# Patient Record
Sex: FEMALE | Race: WHITE | NOT HISPANIC OR LATINO | Employment: UNEMPLOYED | URBAN - METROPOLITAN AREA
[De-identification: names, ages, dates, MRNs, and addresses within clinical notes are randomized per-mention and may not be internally consistent; named-entity substitution may affect disease eponyms.]

---

## 2018-12-12 ENCOUNTER — TELEPHONE (OUTPATIENT)
Dept: PLASTIC SURGERY | Facility: CLINIC | Age: 23
End: 2018-12-12

## 2018-12-12 NOTE — TELEPHONE ENCOUNTER
left voice mail for patient reminding them of appointment scheduled on 12/18/18 at 5:15 p.m.  left call back number.  Elodia Wu LPN

## 2018-12-18 ENCOUNTER — OFFICE VISIT (OUTPATIENT)
Dept: PLASTIC SURGERY | Facility: CLINIC | Age: 23
End: 2018-12-18
Payer: COMMERCIAL

## 2018-12-18 VITALS
TEMPERATURE: 98.1 F | OXYGEN SATURATION: 98 % | WEIGHT: 282.5 LBS | DIASTOLIC BLOOD PRESSURE: 76 MMHG | HEART RATE: 66 BPM | SYSTOLIC BLOOD PRESSURE: 126 MMHG | BODY MASS INDEX: 40.44 KG/M2 | HEIGHT: 70 IN

## 2018-12-18 DIAGNOSIS — Z12.31 VISIT FOR SCREENING MAMMOGRAM: ICD-10-CM

## 2018-12-18 DIAGNOSIS — F64.0 GENDER DYSPHORIA IN ADULT: Primary | ICD-10-CM

## 2018-12-18 RX ORDER — IBUPROFEN 200 MG
200-400 TABLET ORAL
COMMUNITY

## 2018-12-18 ASSESSMENT — MIFFLIN-ST. JEOR: SCORE: 2111.66

## 2018-12-18 NOTE — NURSING NOTE
"Chief Complaint   Patient presents with     Consult     NEW - Top Surgery       Vitals:    12/18/18 1743   BP: 126/76   Pulse: 66   Temp: 98.1  F (36.7  C)   TempSrc: Oral   SpO2: 98%   Weight: 128.1 kg (282 lb 8 oz)   Height: 1.778 m (5' 10\")       Body mass index is 40.53 kg/m .      Lisandro Richards, EMT on 12/18/2018 at 5:47 PM                           "

## 2018-12-18 NOTE — LETTER
12/18/2018       RE: Gabriela Guan  [address]  [place] [place] [address]     Dear Colleague,    Thank you for referring your patient, Gabriela Guan, to the [facility] PLASTIC AND RECONSTRUCTIVE SURGERY at [facility]. Please see a copy of my visit note below.    REFERRING PROVIDER: Chanel Spencer    REASON FOR CONSULTATION: Gender dysphoria, requesting top surgery.    HPI: Patient is a 23-year-old gender non-binary transmasculine individual who prefers he him pronouns or they them pronouns, referred to me by Chanel Spencer for possible top surgery.  The patient has been considering undergoing top surgery for the past 6 years.  By undergoing surgery, they would like to better align their physical body with her chosen gender identity.  It does not bind her chest.  They transitioned 6 years ago.  New name is Shekhar.  Has consider hormone therapy, but has decided against it given that family history includes blood clotting.  Denies any previous breast history.    MEDS:   Prior to Admission medications    Medication Sig Start Date End Date Taking? Authorizing Provider   FLUoxetine (PROZAC) 20 MG capsule TAKE 3 CAPSULES BY MOUTH EVERY DAY 12/3/18   Reported, Patient   ibuprofen (ADVIL/MOTRIN) 200 MG tablet Take 200-400 mg by mouth    Reported, Patient       ALLERGIES:   Allergies   Allergen Reactions     Citalopram Other (See Comments) and Shortness Of Breath     Throat tightness, gradual onset over two days.  Fluoxetine is OK.         PMH: Depression and anxiety.    PSH: None.    SH:   Social History     Tobacco Use     Smoking status: Never Smoker     Smokeless tobacco: Never Used   Substance Use Topics     Alcohol use: Not on file   Works at [employer].    FH: Blood clotting, breast cancer, and dementia.    ROS: Denies chest pain, shortness of breath, MI, CVA, diabetes, DVT, PE, and bleeding disorders.    PHYSICAL EXAMINATION: /76   Pulse 66   Temp 98.1  F  "(36.7  C) (Oral)   Ht 1.778 m (5' 10\")   Wt 128.1 kg (282 lb 8 oz)   SpO2 98%   BMI 40.53 kg/m     General: No acute distress.  Obese body habitus.  On examination of the chest, patient has bilateral grade 3 ptosis.  There are large lateral thoracic rolls bilaterally.  Pectoralis muscles intact bilaterally.  The patient has bilateral axillary breast tissue with the right folding over more than the left.  Breasts are pendulous.  Notch to nipple distance is 32 cm bilaterally.  Air areolar diameter is 7 cm on the 6.5 cm on the left.  Nipple to fold distance is 18 cm on the right and 17 cm on the base diameter 16 cm bilaterally.  There is no palpable breast mass.  There is no nipple discharge.  There is no axillary adenopathy.    ASSESSMENT: Gender dysphoria, requesting top surgery.    PLAN: I explained the need for a letter of support from a mental health professional.  I am also requesting a baseline screening mammogram.  I am recommending weight loss prior to surgery to reduce the risk of wound healing difficulties, asymmetry, contour deformity, and need for revision surgery.  Patient will consider this.  Patient is a potential candidate for bilateral simple complete mastectomy with free nipple graft reconstruction as a form of top surgery.  I explained the procedure in detail today.  I explained the risks to include bleeding, infection, injury to surrounding structures, fluid collection, nipple or nipple graft loss, nipple sensory loss, change in nipple size, wound healing difficulties, contour deformity, dog ears, asymmetry, and need for revision surgery.  Patient accepts these risks and wishes to proceed with surgery.  We will wait for a letter of support and weight loss.    Total time spent with patient was 30 min of which greater than 50% was in counseling.      Markel Zamorano MD      "

## 2018-12-19 ENCOUNTER — PATIENT OUTREACH (OUTPATIENT)
Dept: PLASTIC SURGERY | Facility: CLINIC | Age: 23
End: 2018-12-19

## 2018-12-19 NOTE — PROGRESS NOTES
"REFERRING PROVIDER: Chanel Spencer    REASON FOR CONSULTATION: Gender dysphoria, requesting top surgery.    HPI: Patient is a 23-year-old gender non-binary transmasculine individual who prefers he him pronouns or they them pronouns, referred to me by Chanel Spencer for possible top surgery.  The patient has been considering undergoing top surgery for the past 6 years.  By undergoing surgery, they would like to better align their physical body with her chosen gender identity.  It does not bind her chest.  They transitioned 6 years ago.  New name is Shekhar.  Has consider hormone therapy, but has decided against it given that family history includes blood clotting.  Denies any previous breast history.    MEDS:   Prior to Admission medications    Medication Sig Start Date End Date Taking? Authorizing Provider   FLUoxetine (PROZAC) 20 MG capsule TAKE 3 CAPSULES BY MOUTH EVERY DAY 12/3/18   Reported, Patient   ibuprofen (ADVIL/MOTRIN) 200 MG tablet Take 200-400 mg by mouth    Reported, Patient       ALLERGIES:   Allergies   Allergen Reactions     Citalopram Other (See Comments) and Shortness Of Breath     Throat tightness, gradual onset over two days.  Fluoxetine is OK.         PMH: Depression and anxiety.    PSH: None.    SH:   Social History     Tobacco Use     Smoking status: Never Smoker     Smokeless tobacco: Never Used   Substance Use Topics     Alcohol use: Not on file   Works at [employer].    FH: Blood clotting, breast cancer, and dementia.    ROS: Denies chest pain, shortness of breath, MI, CVA, diabetes, DVT, PE, and bleeding disorders.    PHYSICAL EXAMINATION: /76   Pulse 66   Temp 98.1  F (36.7  C) (Oral)   Ht 1.778 m (5' 10\")   Wt 128.1 kg (282 lb 8 oz)   SpO2 98%   BMI 40.53 kg/m    General: No acute distress.  Obese body habitus.  On examination of the chest, patient has bilateral grade 3 ptosis.  There are large lateral thoracic rolls bilaterally.  Pectoralis muscles intact bilaterally.  " The patient has bilateral axillary breast tissue with the right folding over more than the left.  Breasts are pendulous.  Notch to nipple distance is 32 cm bilaterally.  Air areolar diameter is 7 cm on the 6.5 cm on the left.  Nipple to fold distance is 18 cm on the right and 17 cm on the base diameter 16 cm bilaterally.  There is no palpable breast mass.  There is no nipple discharge.  There is no axillary adenopathy.    ASSESSMENT: Gender dysphoria, requesting top surgery.    PLAN: I explained the need for a letter of support from a mental health professional.  I am also requesting a baseline screening mammogram.  I am recommending weight loss prior to surgery to reduce the risk of wound healing difficulties, asymmetry, contour deformity, and need for revision surgery.  Patient will consider this.  Patient is a potential candidate for bilateral simple complete mastectomy with free nipple graft reconstruction as a form of top surgery.  I explained the procedure in detail today.  I explained the risks to include bleeding, infection, injury to surrounding structures, fluid collection, nipple or nipple graft loss, nipple sensory loss, change in nipple size, wound healing difficulties, contour deformity, dog ears, asymmetry, and need for revision surgery.  Patient accepts these risks and wishes to proceed with surgery.  We will wait for a letter of support and weight loss.    Total time spent with patient was 30 min of which greater than 50% was in counseling.

## 2018-12-20 NOTE — PROGRESS NOTES
[facility]:  Care Coordination Note     SITUATION   Patient is a 23 year old who is receiving support for:  Clinic Care Coordination - Initial  .    BACKGROUND     Pt requesting top surgery with Dr. Zamorano, initial consultation 12/18/18.    ASSESSMENT     Surgery              [facility] Assessment  [facility] Care ([facility]) Enrollment: Enrolled  Patient has a therapist: Yes  Name of therapist: Sanjay Ly  Therapist's phone number: [phone]  Letter of support #1: Requested  Surgery being considered: Yes  Mastectomy: Yes          PLAN       Nursing Interventions:   [facility] program and services discussed with patient. Educational packet provided and reviewed with patient. Process for accessing surgery discussed including: Letters of support, if surgeon requests mammogram, PA insurance process, surgery scheduling, and approximate timeline. MELISSA and photography consent signed by patient.    Follow-up plan:  Patient needs to obtain and provide letter of support for surgery. Per Dr. Zamorano's note pt needs mammogram, complete procedure and inform LPN/trans care coordinator when complete. Then PA will be submitted.         Ulises Mcdonald

## 2019-01-14 ENCOUNTER — TELEPHONE (OUTPATIENT)
Dept: PLASTIC SURGERY | Facility: CLINIC | Age: 24
End: 2019-01-14

## 2019-01-14 NOTE — TELEPHONE ENCOUNTER
Writer spoke with patient. My C message to patient not read so writer reached out to ensure patient has all relevant information pertaining to mammogram screening. Patient expressed understanding and took down this nurses direct phone number in case he has questions or concerns.     Elodia Wu LPN  [facility]

## 2019-02-15 ENCOUNTER — HEALTH MAINTENANCE LETTER (OUTPATIENT)
Age: 24
End: 2019-02-15

## 2020-03-11 ENCOUNTER — HEALTH MAINTENANCE LETTER (OUTPATIENT)
Age: 25
End: 2020-03-11

## 2021-01-03 ENCOUNTER — HEALTH MAINTENANCE LETTER (OUTPATIENT)
Age: 26
End: 2021-01-03

## 2021-04-25 ENCOUNTER — HEALTH MAINTENANCE LETTER (OUTPATIENT)
Age: 26
End: 2021-04-25

## 2021-10-10 ENCOUNTER — HEALTH MAINTENANCE LETTER (OUTPATIENT)
Age: 26
End: 2021-10-10

## 2021-12-17 ENCOUNTER — HOSPITAL ENCOUNTER (EMERGENCY)
Facility: HOSPITAL | Age: 26
Discharge: HOME | End: 2021-12-17
Attending: EMERGENCY MEDICINE | Admitting: EMERGENCY MEDICINE
Payer: COMMERCIAL

## 2021-12-17 ENCOUNTER — APPOINTMENT (OUTPATIENT)
Dept: CT IMAGING | Facility: HOSPITAL | Age: 26
End: 2021-12-17
Attending: EMERGENCY MEDICINE
Payer: COMMERCIAL

## 2021-12-17 VITALS
OXYGEN SATURATION: 97 % | SYSTOLIC BLOOD PRESSURE: 131 MMHG | RESPIRATION RATE: 12 BRPM | HEART RATE: 82 BPM | DIASTOLIC BLOOD PRESSURE: 85 MMHG | BODY MASS INDEX: 40.37 KG/M2 | HEIGHT: 70 IN | TEMPERATURE: 98.4 F | WEIGHT: 282 LBS

## 2021-12-17 DIAGNOSIS — N30.91 HEMORRHAGIC CYSTITIS: ICD-10-CM

## 2021-12-17 LAB
ALBUMIN SERPL-MCNC: 3.9 G/DL (ref 3.5–5)
ALBUMIN UR-MCNC: 70 MG/DL
ALP SERPL-CCNC: 66 U/L (ref 45–120)
ALT SERPL W P-5'-P-CCNC: 32 U/L (ref 0–45)
ANION GAP SERPL CALCULATED.3IONS-SCNC: 10 MMOL/L (ref 5–18)
APPEARANCE UR: ABNORMAL
AST SERPL W P-5'-P-CCNC: 22 U/L (ref 0–40)
BACTERIA #/AREA URNS HPF: ABNORMAL /HPF
BASOPHILS # BLD AUTO: 0 10E3/UL (ref 0–0.2)
BASOPHILS NFR BLD AUTO: 0 %
BILIRUB SERPL-MCNC: 0.6 MG/DL (ref 0–1)
BILIRUB UR QL STRIP: NEGATIVE
BUN SERPL-MCNC: 13 MG/DL (ref 8–22)
CALCIUM SERPL-MCNC: 9.1 MG/DL (ref 8.5–10.5)
CHLORIDE BLD-SCNC: 105 MMOL/L (ref 98–107)
CK SERPL-CCNC: 60 U/L (ref 30–190)
CO2 SERPL-SCNC: 25 MMOL/L (ref 22–31)
COLOR UR AUTO: YELLOW
CREAT SERPL-MCNC: 1.01 MG/DL (ref 0.6–1.3)
EOSINOPHIL # BLD AUTO: 0.1 10E3/UL (ref 0–0.7)
EOSINOPHIL NFR BLD AUTO: 1 %
ERYTHROCYTE [DISTWIDTH] IN BLOOD BY AUTOMATED COUNT: 12.5 % (ref 10–15)
GFR SERPL CREATININE-BSD FRML MDRD: 76 ML/MIN/1.73M2
GLUCOSE BLD-MCNC: 94 MG/DL (ref 70–125)
GLUCOSE UR STRIP-MCNC: NEGATIVE MG/DL
HCT VFR BLD AUTO: 43.4 % (ref 35–53)
HGB BLD-MCNC: 14.5 G/DL (ref 11.7–17.7)
HGB UR QL STRIP: ABNORMAL
IMM GRANULOCYTES # BLD: 0 10E3/UL
IMM GRANULOCYTES NFR BLD: 0 %
KETONES UR STRIP-MCNC: NEGATIVE MG/DL
LEUKOCYTE ESTERASE UR QL STRIP: ABNORMAL
LYMPHOCYTES # BLD AUTO: 2.5 10E3/UL (ref 0.8–5.3)
LYMPHOCYTES NFR BLD AUTO: 26 %
MCH RBC QN AUTO: 29.2 PG (ref 26.5–33)
MCHC RBC AUTO-ENTMCNC: 33.4 G/DL (ref 31.5–36.5)
MCV RBC AUTO: 88 FL (ref 78–100)
MONOCYTES # BLD AUTO: 0.5 10E3/UL (ref 0–1.3)
MONOCYTES NFR BLD AUTO: 5 %
MUCOUS THREADS #/AREA URNS LPF: PRESENT /LPF
NEUTROPHILS # BLD AUTO: 6.5 10E3/UL (ref 1.6–8.3)
NEUTROPHILS NFR BLD AUTO: 68 %
NITRATE UR QL: NEGATIVE
NRBC # BLD AUTO: 0 10E3/UL
NRBC BLD AUTO-RTO: 0 /100
PH UR STRIP: 5.5 [PH] (ref 5–7)
PLATELET # BLD AUTO: 333 10E3/UL (ref 150–450)
POTASSIUM BLD-SCNC: 4.2 MMOL/L (ref 3.5–5)
PROT SERPL-MCNC: 7.6 G/DL (ref 6–8)
RBC # BLD AUTO: 4.96 10E6/UL (ref 3.8–5.9)
RBC URINE: 149 /HPF
SODIUM SERPL-SCNC: 140 MMOL/L (ref 136–145)
SP GR UR STRIP: 1.03 (ref 1–1.03)
SQUAMOUS EPITHELIAL: 47 /HPF
UROBILINOGEN UR STRIP-MCNC: <2 MG/DL
WBC # BLD AUTO: 9.6 10E3/UL (ref 4–11)
WBC CLUMPS #/AREA URNS HPF: PRESENT /HPF
WBC URINE: >182 /HPF

## 2021-12-17 PROCEDURE — 74176 CT ABD & PELVIS W/O CONTRAST: CPT

## 2021-12-17 PROCEDURE — 36415 COLL VENOUS BLD VENIPUNCTURE: CPT | Performed by: EMERGENCY MEDICINE

## 2021-12-17 PROCEDURE — 85025 COMPLETE CBC W/AUTO DIFF WBC: CPT | Performed by: EMERGENCY MEDICINE

## 2021-12-17 PROCEDURE — 80053 COMPREHEN METABOLIC PANEL: CPT | Performed by: EMERGENCY MEDICINE

## 2021-12-17 PROCEDURE — 99285 EMERGENCY DEPT VISIT HI MDM: CPT | Mod: 25

## 2021-12-17 PROCEDURE — 82550 ASSAY OF CK (CPK): CPT | Performed by: EMERGENCY MEDICINE

## 2021-12-17 PROCEDURE — 87086 URINE CULTURE/COLONY COUNT: CPT | Performed by: EMERGENCY MEDICINE

## 2021-12-17 PROCEDURE — 81001 URINALYSIS AUTO W/SCOPE: CPT | Performed by: EMERGENCY MEDICINE

## 2021-12-17 RX ORDER — CEPHALEXIN 500 MG/1
500 CAPSULE ORAL 2 TIMES DAILY
Qty: 10 CAPSULE | Refills: 0 | Status: SHIPPED | OUTPATIENT
Start: 2021-12-17 | End: 2021-12-22

## 2021-12-17 ASSESSMENT — MIFFLIN-ST. JEOR: SCORE: 2094.39

## 2021-12-17 NOTE — ED TRIAGE NOTES
Pt developed dysuria and dark urine today so pt went to urgent care for evaluation. Ua came back with large amt of blood and  Greater than 300 of protein in her urine so she was sent here for  Further workup.pt prefers he/him/they pronouns.

## 2021-12-17 NOTE — ED NOTES
She comes from urgent care where they found her urine to have protein in it. They sent her here for further evaluation. She denies any kidney issues. She does have some cramping in her abdomen.

## 2021-12-17 NOTE — ED PROVIDER NOTES
[facility] EMERGENCY DEPARTMENT  PHYSICIAN NOTE    MRN: [id number]    FINAL IMPRESSION     1. Hemorrhagic cystitis        ED COURSE & MDM       11:47 AM Initial history and physical performed. Plan of care discussed. PPE utilized includes N95 mask, face shield, and gloves.    12:57 PM Patient reevaluated.    2:34 PM Patient reevaluated prior to discharge.    Patient presented for hematuria and proteinuria. Initial vital signs reassuring.  They were sent here for hematuria and proteinuria, however on repeat UA there are more WBCs and RBCs, more consistent with hemorrhagic cystitis.  Renal stones considered, however none are visualized on CT.  The proteinuria is mild, and may be related to infection.  Renal function is normal, making a nephritic or nephrotic syndrome less likely.  I did discuss with the patient that they need to follow-up with the primary care provider to ensure the UA normalizes and that there is nothing more concerning as a cause of the UA results. All available labs reviewed and unremarkable unless otherwise described previously. Patient discharged in stable condition. Return precautions provided. All questions answered.    ===================================================================    HPI     Shekhar Guan is a 26 year old person with no relevant significant PMH presenting with dysuria. Patient states they developed dysuria this morning, so decided to go to urgent care. Patient reports the UA at urgent care showed a high level of blood and protein in the urine, so patient was sent here for further evaluation. Patient denies any back or flank pain. Denies any family history of kidney problems. Patient notes some menstrual-like abdominal cramps, even though they are not on their period. Patient states they had brief chest pain yesterday, but attributes that to anxiety from a job interview. States they have had chest pain with anxiety in the past. Patient denies  "any nausea, vomiting, or fever.    ROS  See HPI.    Problem list, medications, allergies, PMH, PSH, family history, and social history reviewed and updated as able in Epic.      PHYSICAL EXAM     Vitals:    12/17/21 1135   BP: 131/85   Pulse: 82   Resp: 12   Temp: 98.4  F (36.9  C)   TempSrc: Tympanic   SpO2: 97%   Weight: 127.9 kg (282 lb)   Height: 1.778 m (5' 10\")      Constitutional: Alert, no acute distress.  Eyes: Sclera anicteric, EOMI.  Pulm: Non-labored respirations.  Abdomen: Soft, non-tender.  Neuro: A/O x3. Normal speech. No focal deficits.  Skin: Warm and dry, no rash.  Psych: Cooperative, able to follow commands. Intact attention.      TESTING   All testing reviewed and interpreted.    EKG  None.      LABS  Labs Ordered and Resulted from Time of ED Arrival to Time of ED Departure   ROUTINE UA WITH MICROSCOPIC REFLEX TO CULTURE - Abnormal       Result Value    Color Urine Yellow      Appearance Urine Cloudy (*)     Glucose Urine Negative      Bilirubin Urine Negative      Ketones Urine Negative      Specific Gravity Urine 1.030      Blood Urine >1.0 mg/dL (*)     pH Urine 5.5      Protein Albumin Urine 70  (*)     Urobilinogen Urine <2.0      Nitrite Urine Negative      Leukocyte Esterase Urine 500 Jen/uL (*)     Bacteria Urine Many (*)     WBC Clumps Urine Present (*)     Mucus Urine Present (*)     RBC Urine 149 (*)     WBC Urine >182 (*)     Squamous Epithelials Urine 47 (*)    COMPREHENSIVE METABOLIC PANEL - Normal    Sodium 140      Potassium 4.2      Chloride 105      Carbon Dioxide (CO2) 25      Anion Gap 10      Urea Nitrogen 13      Creatinine 1.01      Calcium 9.1      Glucose 94      Alkaline Phosphatase 66      AST 22      ALT 32      Protein Total 7.6      Albumin 3.9      Bilirubin Total 0.6      GFR Estimate 76     CK TOTAL - Normal    CK 60     CBC WITH PLATELETS AND DIFFERENTIAL    WBC Count 9.6      RBC Count 4.96      Hemoglobin 14.5      Hematocrit 43.4      MCV 88      MCH 29.2      " MCHC 33.4      RDW 12.5      Platelet Count 333      % Neutrophils 68      % Lymphocytes 26      % Monocytes 5      % Eosinophils 1      % Basophils 0      % Immature Granulocytes 0      NRBCs per 100 WBC 0      Absolute Neutrophils 6.5      Absolute Lymphocytes 2.5      Absolute Monocytes 0.5      Absolute Eosinophils 0.1      Absolute Basophils 0.0      Absolute Immature Granulocytes 0.0      Absolute NRBCs 0.0     URINE CULTURE       IMAGING  CT Abdomen Pelvis w/o Contrast   Final Result   IMPRESSION:    1.  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    2.  Kidneys, ureters and bladder otherwise normal.           I attest that Leoncio Fairbanks is acting in a scribe capacity, has observed my performance of services, and has documented them in accordance with my direction.     Larry Jennings MD  12/17/21 [id number]

## 2021-12-18 LAB — BACTERIA UR CULT: NORMAL

## 2021-12-20 ENCOUNTER — PATIENT OUTREACH (OUTPATIENT)
Dept: NURSING | Facility: CLINIC | Age: 26
End: 2021-12-20
Payer: COMMERCIAL

## 2021-12-20 ENCOUNTER — TELEPHONE (OUTPATIENT)
Dept: CARE COORDINATION | Facility: CLINIC | Age: 26
End: 2021-12-20

## 2021-12-20 ENCOUNTER — PATIENT OUTREACH (OUTPATIENT)
Dept: CARE COORDINATION | Facility: CLINIC | Age: 26
End: 2021-12-20

## 2021-12-20 DIAGNOSIS — Z71.89 COUNSELING AND COORDINATION OF CARE: Primary | ICD-10-CM

## 2021-12-20 SDOH — HEALTH STABILITY: PHYSICAL HEALTH: ON AVERAGE, HOW MANY MINUTES DO YOU ENGAGE IN EXERCISE AT THIS LEVEL?: 0 MIN

## 2021-12-20 SDOH — ECONOMIC STABILITY: FOOD INSECURITY: WITHIN THE PAST 12 MONTHS, YOU WORRIED THAT YOUR FOOD WOULD RUN OUT BEFORE YOU GOT MONEY TO BUY MORE.: NEVER TRUE

## 2021-12-20 SDOH — ECONOMIC STABILITY: TRANSPORTATION INSECURITY
IN THE PAST 12 MONTHS, HAS THE LACK OF TRANSPORTATION KEPT YOU FROM MEDICAL APPOINTMENTS OR FROM GETTING MEDICATIONS?: NO

## 2021-12-20 SDOH — ECONOMIC STABILITY: TRANSPORTATION INSECURITY
IN THE PAST 12 MONTHS, HAS LACK OF TRANSPORTATION KEPT YOU FROM MEETINGS, WORK, OR FROM GETTING THINGS NEEDED FOR DAILY LIVING?: NO

## 2021-12-20 SDOH — HEALTH STABILITY: PHYSICAL HEALTH: ON AVERAGE, HOW MANY DAYS PER WEEK DO YOU ENGAGE IN MODERATE TO STRENUOUS EXERCISE (LIKE A BRISK WALK)?: 0 DAYS

## 2021-12-20 SDOH — ECONOMIC STABILITY: FOOD INSECURITY: WITHIN THE PAST 12 MONTHS, THE FOOD YOU BOUGHT JUST DIDN'T LAST AND YOU DIDN'T HAVE MONEY TO GET MORE.: NEVER TRUE

## 2021-12-20 ASSESSMENT — SOCIAL DETERMINANTS OF HEALTH (SDOH)
ARE YOU MARRIED, WIDOWED, DIVORCED, SEPARATED, NEVER MARRIED, OR LIVING WITH A PARTNER?: NEVER MARRIED
HOW OFTEN DO YOU GET TOGETHER WITH FRIENDS OR RELATIVES?: MORE THAN THREE TIMES A WEEK
WITHIN THE LAST YEAR, HAVE TO BEEN RAPED OR FORCED TO HAVE ANY KIND OF SEXUAL ACTIVITY BY YOUR PARTNER OR EX-PARTNER?: NO
WITHIN THE LAST YEAR, HAVE YOU BEEN AFRAID OF YOUR PARTNER OR EX-PARTNER?: NO
HOW OFTEN DO YOU ATTEND CHURCH OR RELIGIOUS SERVICES?: NEVER
WITHIN THE LAST YEAR, HAVE YOU BEEN HUMILIATED OR EMOTIONALLY ABUSED IN OTHER WAYS BY YOUR PARTNER OR EX-PARTNER?: NO
DO YOU BELONG TO ANY CLUBS OR ORGANIZATIONS SUCH AS CHURCH GROUPS UNIONS, FRATERNAL OR ATHLETIC GROUPS, OR SCHOOL GROUPS?: NO
HOW OFTEN DO YOU ATTENT MEETINGS OF THE CLUB OR ORGANIZATION YOU BELONG TO?: MORE THAN 4 TIMES PER YEAR
WITHIN THE LAST YEAR, HAVE YOU BEEN KICKED, HIT, SLAPPED, OR OTHERWISE PHYSICALLY HURT BY YOUR PARTNER OR EX-PARTNER?: NO
IN A TYPICAL WEEK, HOW MANY TIMES DO YOU TALK ON THE PHONE WITH FAMILY, FRIENDS, OR NEIGHBORS?: MORE THAN THREE TIMES A WEEK

## 2021-12-20 ASSESSMENT — ACTIVITIES OF DAILY LIVING (ADL): DEPENDENT_IADLS:: INDEPENDENT

## 2021-12-20 NOTE — PROGRESS NOTES
12/20/2021  Clinic Care Coordination Contact  Care Team Conversations     Patient enrolled in [facility] as of  12-20-21  Reviewed assessment, goals, any delegations from CCC SW , and consult with CC SW as needed.    Follow up appt with [facility] RN/[facility] SW: none    CHW Follow up: Monthly    CHW Plan: Follow up on goals    CHW Next Follow Up: 1-20-22

## 2021-12-20 NOTE — TELEPHONE ENCOUNTER
Hello,    I spoke with patient and he would like to establish care at [facility]. He would prefer to work with Dr. Christensen, if possible, if not, a female provider.    Would like to establish care, discuss mental health/autism, and update consent to communicate.    Thank you!

## 2021-12-20 NOTE — LETTER
[facility] CARE COORDINATION    December 20, 2021    Shekhar Guan  [address]  [place] [facility] [place] [address]      Dear Shekhar,    I am a clinic care coordinator who works with Physician Pamela Montes De Oca at [facility]. I wanted to thank you for spending the time to talk with me.  Below is a description of clinic care coordination and how I can further assist you.      The clinic care coordination team is made up of a registered nurse,  and community health worker who understand the health care system. The goal of clinic care coordination is to help you manage your health and improve access to the health care system in the most efficient manner. The team can assist you in meeting your health care goals by providing education, coordinating services, strengthening the communication among your providers and supporting you with any resource needs.    Please feel free to contact the Community Health Worker at [phone] with any questions or concerns. We are focused on providing you with the highest-quality healthcare experience possible and that all starts with you.     Sincerely,     Aly Matos, [facility]    Enclosed: I have enclosed a copy of the Patient Centered Plan of Care. This has helpful information and goals that we have talked about. Please keep this in an easy to access place to use as needed.

## 2021-12-20 NOTE — PROGRESS NOTES
"Clinic Care Coordination Contact  [facility] MCKENZIE completed an assessment with Shekhar and his dad on the phone for enrollment into [facility].    Shekhar is looking for therapy to support with anxiety and depression. Would like to work with someone who is familiar with the LGBTQ community and trans experiences. Would prefer [place]. MCKENZIE will look for resources. Shekhar has medications currently and feels they are helpful. Has done DBT and therapy in the past.     Shekhar would like to establish care at [facility]. Would like to discuss mental health and possible autism at this visit. MCKENZIE messaged scheduling pool to support.    Reported he has enough food at home and is safe there. Living with parents currently. Is a \"picky\" eater, so only eating about once per day. Knows this isn't good and should work on cooking more, but isn't motivated to do so.     Likes video games and reading. Is in a book club, meet once monthly     Got booster shot today for COVID-19    Clinic Care Coordination Contact  OUTREACH    Referral Information:  Referral Source: PCP    Primary Diagnosis: Psychosocial    Chief Complaint   Patient presents with     Clinic Care Coordination - Initial        Universal Utilization:   Clinic Utilization  Difficulty keeping appointments:: No  Compliance Concerns: No  No-Show Concerns: No  No PCP office visit in Past Year: Yes  Utilization    Hospital Admissions  0             ED Visits  1             No Show Count (past year)  0                Current as of: 12/20/2021  2:54 PM              Clinical Concerns:  Current Medical Concerns:  None reported    Current Behavioral Concerns: Depression and anxiett    Education Provided to patient: Role of CCC   Pain  Pain (GOAL):: No  Health Maintenance Reviewed: Not assessed  Clinical Pathway: None    Medication Management:  Medication review status: Medications reviewed and no changes reported per patient.             Functional Status:  Dependent ADLs:: Independent  Dependent IADLs:: " Independent  Bed or wheelchair confined:: No  Mobility Status: Independent  Fallen 2 or more times in the past year?: No  Any fall with injury in the past year?: No    Living Situation:  Current living arrangement:: I live in a private home with family  Type of residence:: Private home - stairs    Lifestyle & Psychosocial Needs:    Social Determinants of Health     Tobacco Use: Low Risk      Smoking Tobacco Use: Never Smoker     Smokeless Tobacco Use: Never Used   Alcohol Use: Not on file   Financial Resource Strain: Not on file   Food Insecurity: No Food Insecurity     Worried About Running Out of Food in the Last Year: Never true     Ran Out of Food in the Last Year: Never true   Transportation Needs: No Transportation Needs     Lack of Transportation (Medical): No     Lack of Transportation (Non-Medical): No   Physical Activity: Inactive     Days of Exercise per Week: 0 days     Minutes of Exercise per Session: 0 min   Stress: Stress Concern Present     Feeling of Stress : To some extent   Social Connections: Moderately Isolated     Frequency of Communication with Friends and Family: More than three times a week     Frequency of Social Gatherings with Friends and Family: More than three times a week     Attends Christian Services: Never     Active Member of Clubs or Organizations: No     Attends Club or Organization Meetings: More than 4 times per year     Marital Status: Never    Intimate Partner Violence: Not At Risk     Fear of Current or Ex-Partner: No     Emotionally Abused: No     Physically Abused: No     Sexually Abused: No   Depression: Not on file   Housing Stability: Not on file     Diet:: Regular  Inadequate nutrition (GOAL):: Yes  Tube Feeding: No  Inadequate activity/exercise (GOAL):: Yes  Significant changes in sleep pattern (GOAL): No  Transportation means:: Regular car     Christian or spiritual beliefs that impact treatment:: No  Mental health DX:: Yes  Mental health DX how managed::  Medication  Mental health management concern (GOAL):: Yes  Chemical Dependency Status: No Current Concerns  Informal Support system:: Family,Friends             Resources and Interventions:  Current Resources:               Employment Status: employed part-time         Advance Care Plan/Directive  Advanced Care Plans/Directives on file:: No  Advanced Care Plan/Directive Status: Not Applicable          Goals:   Goals        General     Medical (pt-stated)      Notes - Note created  12/20/2021  3:46 PM by Aly Matos LICSW     Goal Statement: I want to establish care at [facility] within one month  Date Goal set: 12/20/21  Barriers:   Strengths:   Date to Achieve By: 1/30/2022  Patient expressed understanding of goal: Yes  Action steps to achieve this goal:  1. I will wait for [facility] scheduling to contact me to schedule a visit  2. I will update CCC team           Mental Health Management (pt-stated)      Notes - Note created  12/20/2021  3:46 PM by Aly Matos LICSW     Goal Statement: I want to connect with a mental health therapist within 1-2 months  Date Goal set: 12/20/21  Barriers:   Strengths:   Date to Achieve By: 2/29/2022  Patient expressed understanding of goal: Yes  Action steps to achieve this goal:  1. [facility] SW will send me resources to review  2. I will review the resources and schedule or let [facility] team know if I need help scheduling               Patient/Caregiver understanding: Reported understanding            Plan: Standard outreach

## 2021-12-20 NOTE — LETTER
[facility]  Patient Centered Plan of Care  About Me:        Patient Name:  Shekhar Guan    YOB: 1995  Age:         26 year old   Eliud MRN:    [id number] Telephone Information:  Home Phone [phone]   Mobile Not on file.       Address:  [address] [facility] [address] Email address:  [email or web address]      Emergency Contact(s)    Name Relationship Lgl Grd Work Phone Home Phone Mobile Phone   TAMI CANCHOLA Father   [phone]            Primary language:  English     needed? No   [employer]:  [phone] op. 1  Other communication barriers:No data recorded  Preferred Method of Communication:  Mail  Current living arrangement: I live in a private home with family    Mobility Status/ Medical Equipment: Independent        Health Maintenance  Health Maintenance Reviewed: Not assessed      My Access Plan  Medical Emergency 911   Primary Clinic Line [facility] - [phone]   24 Hour Appointment Line [phone] or  [phone] ([phone]) (toll-free)   24 Hour Nurse Line [phone] (toll-free)   Preferred Urgent Care [facility] [phone]     Preferred Hospital [facility]  [phone]     Preferred Pharmacy [facility] [address] [place] [facility] - [address]     Behavioral Health Crisis Line The National Suicide Prevention Lifeline at 1-276.484.2216 or 911             My Care Team Members  Patient Care Team       Relationship Specialty Notifications Start End    No Ref-Primary, Physician PCP - General   12/17/21     NO DR/CLINIC    Fax: [phone]         Chanel Spencer NP Referring Physician Nurse Practitioner  10/4/18     Referring to Plastic Surgery    Phone: [phone] Fax: [phone]         [facility] [address] [facility] [address]    Markel Zamorano MD MD Plastic Surgery  12/19/18     Phone: [phone] Fax: [phone]          [facility] [address] [facility] [address]    Ulises Mcdonald Specialty Care Coordinator Plastic Surgery  12/19/18     Phone: [phone]         Gege Carranza RN Specialty Care Coordinator Plastic Surgery  5/31/19     Phone: [phone]         Aly Matos LICSW Lead Care Coordinator  Admissions 12/20/21     Adriana Robin MA Community Health Worker  Admissions 12/20/21             My Care Plans  Self Management and Treatment Plan  Goals and (Comments)  Goals        General     Medical (pt-stated)      Notes - Note created  12/20/2021  3:46 PM by Aly Matos LICSW     Goal Statement: I want to establish care at [facility] within one month  Date Goal set: 12/20/21  Barriers:   Strengths:   Date to Achieve By: 1/30/2022  Patient expressed understanding of goal: Yes  Action steps to achieve this goal:  1. I will wait for [facility] scheduling to contact me to schedule a visit  2. I will update CCC team           Mental Health Management (pt-stated)      Notes - Note created  12/20/2021  3:46 PM by Aly Matos LICSW     Goal Statement: I want to connect with a mental health therapist within 1-2 months  Date Goal set: 12/20/21  Barriers:   Strengths:   Date to Achieve By: 2/29/2022  Patient expressed understanding of goal: Yes  Action steps to achieve this goal:  1. [facility] SW will send me resources to review  2. I will review the resources and schedule or let [facility] team know if I need help scheduling                Action Plans on File:                       Advance Care Plans/Directives Type:   No data recorded    My Medical and Care Information  Problem List   There is no problem list on file for this patient.     Current Medications and Allergies:  See printed Medication Report.    Care Coordination Start Date: 12/20/2021   Frequency of Care Coordination: No data recorded   Form Last Updated: 12/20/2021

## 2022-01-09 ENCOUNTER — E-VISIT (OUTPATIENT)
Dept: URGENT CARE | Facility: URGENT CARE | Age: 27
End: 2022-01-09
Payer: COMMERCIAL

## 2022-01-09 DIAGNOSIS — Z20.822 CLOSE EXPOSURE TO 2019 NOVEL CORONAVIRUS: Primary | ICD-10-CM

## 2022-01-09 PROCEDURE — 99421 OL DIG E/M SVC 5-10 MIN: CPT | Performed by: PREVENTIVE MEDICINE

## 2022-01-11 ENCOUNTER — LAB (OUTPATIENT)
Dept: LAB | Facility: CLINIC | Age: 27
End: 2022-01-11
Attending: PREVENTIVE MEDICINE
Payer: COMMERCIAL

## 2022-01-11 DIAGNOSIS — Z20.822 CLOSE EXPOSURE TO 2019 NOVEL CORONAVIRUS: ICD-10-CM

## 2022-01-11 PROCEDURE — U0003 INFECTIOUS AGENT DETECTION BY NUCLEIC ACID (DNA OR RNA); SEVERE ACUTE RESPIRATORY SYNDROME CORONAVIRUS 2 (SARS-COV-2) (CORONAVIRUS DISEASE [COVID-19]), AMPLIFIED PROBE TECHNIQUE, MAKING USE OF HIGH THROUGHPUT TECHNOLOGIES AS DESCRIBED BY CMS-2020-01-R: HCPCS

## 2022-01-11 PROCEDURE — U0005 INFEC AGEN DETEC AMPLI PROBE: HCPCS

## 2022-01-12 LAB — SARS-COV-2 RNA RESP QL NAA+PROBE: NEGATIVE

## 2022-01-20 ENCOUNTER — PATIENT OUTREACH (OUTPATIENT)
Dept: CARE COORDINATION | Facility: CLINIC | Age: 27
End: 2022-01-20
Payer: COMMERCIAL

## 2022-01-20 NOTE — PROGRESS NOTES
Clinic Care Coordination Contact  [facility]/Voicemail    Reason: CCC CHW Follow Up Called     Clinical Data: Care Coordinator Outreach:  Outreach attempted x 1: Left message on patient's voicemail with call back information and requested return call.  Plan: Care Coordinator will try to reach patient again in 10 business days.

## 2022-01-25 ENCOUNTER — PATIENT OUTREACH (OUTPATIENT)
Dept: CARE COORDINATION | Facility: CLINIC | Age: 27
End: 2022-01-25
Payer: COMMERCIAL

## 2022-01-25 NOTE — PROGRESS NOTES
Clinic Care Coordination Contact    Situation: Patient chart reviewed by carecoordinator.    Background: MCKENZIE completed chart review    Assessment: Patient has been unable to be reached by CHW. Shekhar scheduled an [facility] care visit for 2/08    Plan/Recommendations: Standard Outreach

## 2022-02-07 PROBLEM — R31.0 GROSS HEMATURIA: Status: ACTIVE | Noted: 2022-02-07

## 2022-02-07 PROBLEM — K11.6 PAROTID CYST: Status: ACTIVE | Noted: 2018-03-07

## 2022-02-07 PROBLEM — F33.0 MAJOR DEPRESSIVE DISORDER, RECURRENT EPISODE, MILD (H): Status: ACTIVE | Noted: 2018-09-12

## 2022-02-07 RX ORDER — FLUOXETINE 40 MG/1
CAPSULE ORAL
COMMUNITY
Start: 2021-12-28

## 2022-02-07 RX ORDER — ACETAMINOPHEN 325 MG/1
325-650 TABLET ORAL
COMMUNITY

## 2022-02-08 ENCOUNTER — OFFICE VISIT (OUTPATIENT)
Dept: FAMILY MEDICINE | Facility: CLINIC | Age: 27
End: 2022-02-08
Payer: COMMERCIAL

## 2022-02-08 VITALS
SYSTOLIC BLOOD PRESSURE: 122 MMHG | HEIGHT: 70 IN | WEIGHT: 293 LBS | RESPIRATION RATE: 18 BRPM | BODY MASS INDEX: 41.95 KG/M2 | HEART RATE: 106 BPM | DIASTOLIC BLOOD PRESSURE: 83 MMHG | TEMPERATURE: 97.6 F

## 2022-02-08 DIAGNOSIS — Z12.4 SCREENING FOR CERVICAL CANCER: ICD-10-CM

## 2022-02-08 DIAGNOSIS — L30.4 INTERTRIGO: ICD-10-CM

## 2022-02-08 DIAGNOSIS — E66.01 MORBID OBESITY (H): ICD-10-CM

## 2022-02-08 DIAGNOSIS — Z00.00 ANNUAL PHYSICAL EXAM: Primary | ICD-10-CM

## 2022-02-08 DIAGNOSIS — F33.2 SEVERE EPISODE OF RECURRENT MAJOR DEPRESSIVE DISORDER, WITHOUT PSYCHOTIC FEATURES (H): ICD-10-CM

## 2022-02-08 LAB
ALBUMIN SERPL-MCNC: 3.7 G/DL (ref 3.5–5)
ALP SERPL-CCNC: 68 U/L (ref 45–120)
ALT SERPL W P-5'-P-CCNC: 27 U/L (ref 0–45)
ANION GAP SERPL CALCULATED.3IONS-SCNC: 9 MMOL/L (ref 5–18)
AST SERPL W P-5'-P-CCNC: 20 U/L (ref 0–40)
BILIRUB SERPL-MCNC: 0.5 MG/DL (ref 0–1)
BUN SERPL-MCNC: 13 MG/DL (ref 8–22)
CALCIUM SERPL-MCNC: 8.8 MG/DL (ref 8.5–10.5)
CHLORIDE BLD-SCNC: 106 MMOL/L (ref 98–107)
CO2 SERPL-SCNC: 22 MMOL/L (ref 22–31)
CREAT SERPL-MCNC: 0.92 MG/DL (ref 0.6–1.3)
ERYTHROCYTE [DISTWIDTH] IN BLOOD BY AUTOMATED COUNT: 12.4 % (ref 10–15)
GFR SERPL CREATININE-BSD FRML MDRD: 87 ML/MIN/1.73M2
GLUCOSE BLD-MCNC: 86 MG/DL (ref 70–125)
HBA1C MFR BLD: 5 % (ref 0–5.6)
HCT VFR BLD AUTO: 42.6 % (ref 35–53)
HGB BLD-MCNC: 14.2 G/DL (ref 11.7–17.7)
MCH RBC QN AUTO: 28.9 PG (ref 26.5–33)
MCHC RBC AUTO-ENTMCNC: 33.3 G/DL (ref 31.5–36.5)
MCV RBC AUTO: 87 FL (ref 78–100)
PLATELET # BLD AUTO: 308 10E3/UL (ref 150–450)
POTASSIUM BLD-SCNC: 4.2 MMOL/L (ref 3.5–5)
PROT SERPL-MCNC: 7.1 G/DL (ref 6–8)
RBC # BLD AUTO: 4.92 10E6/UL (ref 3.8–5.9)
SODIUM SERPL-SCNC: 137 MMOL/L (ref 136–145)
TSH SERPL DL<=0.005 MIU/L-ACNC: 3.47 UIU/ML (ref 0.3–5)
WBC # BLD AUTO: 5.5 10E3/UL (ref 4–11)

## 2022-02-08 PROCEDURE — 90471 IMMUNIZATION ADMIN: CPT | Performed by: FAMILY MEDICINE

## 2022-02-08 PROCEDURE — 80053 COMPREHEN METABOLIC PANEL: CPT | Performed by: FAMILY MEDICINE

## 2022-02-08 PROCEDURE — 83036 HEMOGLOBIN GLYCOSYLATED A1C: CPT | Performed by: FAMILY MEDICINE

## 2022-02-08 PROCEDURE — G0123 SCREEN CERV/VAG THIN LAYER: HCPCS | Performed by: FAMILY MEDICINE

## 2022-02-08 PROCEDURE — 84443 ASSAY THYROID STIM HORMONE: CPT | Performed by: FAMILY MEDICINE

## 2022-02-08 PROCEDURE — 90686 IIV4 VACC NO PRSV 0.5 ML IM: CPT | Performed by: FAMILY MEDICINE

## 2022-02-08 PROCEDURE — 99385 PREV VISIT NEW AGE 18-39: CPT | Mod: 25 | Performed by: FAMILY MEDICINE

## 2022-02-08 PROCEDURE — 86803 HEPATITIS C AB TEST: CPT | Performed by: FAMILY MEDICINE

## 2022-02-08 PROCEDURE — 99213 OFFICE O/P EST LOW 20 MIN: CPT | Mod: 25 | Performed by: FAMILY MEDICINE

## 2022-02-08 PROCEDURE — 36415 COLL VENOUS BLD VENIPUNCTURE: CPT | Performed by: FAMILY MEDICINE

## 2022-02-08 PROCEDURE — 85027 COMPLETE CBC AUTOMATED: CPT | Performed by: FAMILY MEDICINE

## 2022-02-08 PROCEDURE — 90472 IMMUNIZATION ADMIN EACH ADD: CPT | Performed by: FAMILY MEDICINE

## 2022-02-08 PROCEDURE — 82306 VITAMIN D 25 HYDROXY: CPT | Performed by: FAMILY MEDICINE

## 2022-02-08 PROCEDURE — 87389 HIV-1 AG W/HIV-1&-2 AB AG IA: CPT | Performed by: FAMILY MEDICINE

## 2022-02-08 PROCEDURE — 90715 TDAP VACCINE 7 YRS/> IM: CPT | Performed by: FAMILY MEDICINE

## 2022-02-08 ASSESSMENT — MIFFLIN-ST. JEOR: SCORE: 2163.22

## 2022-02-08 ASSESSMENT — PATIENT HEALTH QUESTIONNAIRE - PHQ9: SUM OF ALL RESPONSES TO PHQ QUESTIONS 1-9: 19

## 2022-02-08 NOTE — PROGRESS NOTES
Answers for HPI/ROS submitted by the patient on 2/7/2022  Frequency of exercise:: None  Getting at least 3 servings of Calcium per day:: NO  Diet:: Regular (no restrictions)  Taking medications regularly:: Yes  Medication side effects:: None  Bi-annual eye exam:: NO  Dental care twice a year:: NO  Sleep apnea or symptoms of sleep apnea:: None  Additional concerns today:: Yes
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
## 2022-02-09 PROBLEM — R87.610 ASCUS OF CERVIX WITH NEGATIVE HIGH RISK HPV: Status: RESOLVED | Noted: 2018-09-12 | Resolved: 2022-02-09

## 2022-02-09 LAB
BKR LAB AP GYN ADEQUACY: NORMAL
BKR LAB AP GYN INTERPRETATION: NORMAL
BKR LAB AP HPV REFLEX: NORMAL
BKR LAB AP LMP: NORMAL
BKR LAB AP PREVIOUS ABNORMAL: NORMAL
DEPRECATED CALCIDIOL+CALCIFEROL SERPL-MC: 13 UG/L (ref 30–80)
PATH REPORT.COMMENTS IMP SPEC: NORMAL
PATH REPORT.COMMENTS IMP SPEC: NORMAL
PATH REPORT.RELEVANT HX SPEC: NORMAL

## 2022-02-09 RX ORDER — CLOTRIMAZOLE 1 %
CREAM (GRAM) TOPICAL 2 TIMES DAILY
Qty: 45 G | Refills: 1 | Status: SHIPPED | OUTPATIENT
Start: 2022-02-09

## 2022-02-10 PROBLEM — F33.2 SEVERE EPISODE OF RECURRENT MAJOR DEPRESSIVE DISORDER, WITHOUT PSYCHOTIC FEATURES (H): Status: ACTIVE | Noted: 2018-09-12

## 2022-02-10 PROBLEM — L30.4 INTERTRIGO: Status: ACTIVE | Noted: 2022-02-10

## 2022-02-10 PROBLEM — R31.0 GROSS HEMATURIA: Status: RESOLVED | Noted: 2022-02-07 | Resolved: 2022-02-10

## 2022-02-10 LAB
HCV AB SERPL QL IA: NEGATIVE
HIV 1+2 AB+HIV1 P24 AG SERPL QL IA: NEGATIVE

## 2022-02-11 ENCOUNTER — MYC MEDICAL ADVICE (OUTPATIENT)
Dept: FAMILY MEDICINE | Facility: CLINIC | Age: 27
End: 2022-02-11
Payer: COMMERCIAL

## 2022-02-11 DIAGNOSIS — E55.9 VITAMIN D INSUFFICIENCY: Primary | ICD-10-CM

## 2022-02-12 RX ORDER — ERGOCALCIFEROL 1.25 MG/1
50000 CAPSULE, LIQUID FILLED ORAL WEEKLY
Qty: 12 CAPSULE | Refills: 1 | Status: SHIPPED | OUTPATIENT
Start: 2022-02-12

## 2022-02-15 ENCOUNTER — PATIENT OUTREACH (OUTPATIENT)
Dept: CARE COORDINATION | Facility: CLINIC | Age: 27
End: 2022-02-15
Payer: COMMERCIAL

## 2022-02-15 NOTE — PROGRESS NOTES
Clinic Care Coordination Contact    Situation: Patient chart reviewed by care coordinator.    Background: MCKENZIE completed chart review    Assessment: Patient attended establish care visit      Plan/Recommendations: Standard outreach

## 2022-02-16 ENCOUNTER — PATIENT OUTREACH (OUTPATIENT)
Dept: NURSING | Facility: CLINIC | Age: 27
End: 2022-02-16
Payer: COMMERCIAL

## 2022-02-16 NOTE — PROGRESS NOTES
Clinic Care Coordination Contact  [facility]/Voicemail    Reason: CCC CHW Follow Up Called     Clinical Data: Care Coordinator Outreach:  Outreach attempted x 1:  Left message on patient's voicemail with call back information and requested return call.  Note/comment: CCC CHW received a voice message from a patient stated returning CHW called. Voice message from caller is unclear due to connection service.   Plan: Care Coordinator will try to reach patient again in 10 business days.

## 2022-03-02 ENCOUNTER — PATIENT OUTREACH (OUTPATIENT)
Dept: CARE COORDINATION | Facility: CLINIC | Age: 27
End: 2022-03-02
Payer: COMMERCIAL

## 2022-03-02 NOTE — PROGRESS NOTES
Clinic Care Coordination Contact  [facility]/Voicemail    Reason: CCC CHW Follow Up Called    Clinical Data: Care Coordinator Outreach  Outreach attempted x 1:  Left message on patient's voicemail with call back information and requested return call.  Note/comment: Pt's father Adebayo Guan returning CHW called from encounter 2-. Adebayo left a voice message on CHW office VM on Monday, 2-28-22.  Plan: Care Coordinator will try to reach patient again in 10 business days.

## 2022-03-10 ENCOUNTER — PATIENT OUTREACH (OUTPATIENT)
Dept: CARE COORDINATION | Facility: CLINIC | Age: 27
End: 2022-03-10
Payer: COMMERCIAL

## 2022-03-18 ENCOUNTER — PATIENT OUTREACH (OUTPATIENT)
Dept: CARE COORDINATION | Facility: CLINIC | Age: 27
End: 2022-03-18
Payer: COMMERCIAL

## 2022-03-18 NOTE — PROGRESS NOTES
Clinic Care Coordination Contact  [facility]/Voicemail    Reason: CCC CHW Follow Up via [employer]     Clinical Data: Care Coordinator Outreach:  Outreach attempted x 2: Patient is due for [facility] CHW follow up called. As of today's date 3/18/2022 at 1:00PM CHW voicemail checked, CHW received no returning called/VM from patient regarding to CHW outreach encounter dated 3-. No consent to communication form found to speak with the patient's father Adebayo Guan. CHW consulted this with [facility] Lead Care Coordinator/SW today via Teams and seeking best way to connect with patient for follow up on the patient current goals (set with [facility] team).     CHW sent a [employer] message to patient today, 3- to connect with CHW for follow up. Note: Mychart message documented in a  encounter with today's date.     Plan: Waiting to hear from patient. Mychart message sent to patient today. Care Coordinator will try to reach patient again in 1 month.

## 2022-04-01 ENCOUNTER — PATIENT OUTREACH (OUTPATIENT)
Dept: CARE COORDINATION | Facility: CLINIC | Age: 27
End: 2022-04-01
Payer: COMMERCIAL

## 2022-04-01 NOTE — PROGRESS NOTES
"    Clinic Care Coordination Contact    Follow Up Progress Note      Assessment:  Care team message to CCC request phone call outreach from father  RN CC introduce self and role in CCC  Since last outreach with [facility] patient attended visit to establish care with Dr Christensen.    Pt notes challenges with social interaction and would like support in finding a new psychiatrist and therapist.  Pt state that they would  like to have some testing for potential spectrum disorder- concern that current provider was using an \"old definition\" and notes that they get overwhelmed with crowds. Feels like there is a dx that has not been names and would like to figure out to be able to working on coping mechanisms and strategies.     Pt notes that some things that have been given to try and help \"either need to be when alone or are not able to apply while at work\" Currently works at [employer], enjoys some aspects, but can be overwhelmed and will need take sometime away to recover after shift\"    Chart review notes that there is not a consent to speak with father on file and father would like to support patient. Pt did give verbal consent during call. [facility] team will send out consent form for patient to fill out to support communication and care giver support communication. Pt verbalized understanding     Father spoke with RNCC upon consent and noted concern for patient long term health and plan for future.  Father notes that patient spends 22-24 hours in bed with the exception of 1-2 shift of work a week ( 3-5 hour shift). Parent concern that pt will not be able to care for self in future and would like to support plan if disability is determined or next steps to support pt long term plan.   Father notes that he and mother will be out of town for a few weeks at end of April and pt has expressed concern about being home alone. Pt has older sibling ( sister,Agnes) who has had a strained relationship with in past, but is agreeable to her " coming over to check on pt while they are away.     RN CC schedule a phone visit with MCKENZIE CC and pt to discuss options for establishing care with Behavioral Health Providers and next steps.         Goals addressed this encounter:   Goals Addressed                    This Visit's Progress       Patient Stated       COMPLETED: Medical (pt-stated)         Goal Statement: I want to establish care at [facility] within one month  Date Goal set: 12/20/21  Barriers:   Strengths:   Date to Achieve By: 1/30/2022  Patient expressed understanding of goal: Yes  Action steps to achieve this goal:  1. I will wait for [facility] scheduling to contact me to schedule a visit  2. I will update CCC team             Mental Health Management (pt-stated)   10%      Goal Statement: I want to connect with a mental health therapist within 1-2 months  Date Goal set: 12/20/21  Barriers:   Strengths:   Date to Achieve By: 2/29/2022  Patient expressed understanding of goal: Yes  Action steps to achieve this goal:  1. [facility] SW will send me resources to review  2. I will review the resources and schedule or let [facility] team know if I need help scheduling            Other (pt-stated)         Goal Statement: I will complete a consent to communicate with father within 1-2 months  Date Goal set: 04/01/22  Barriers:   Strengths:   Date to Achieve By: April  Patient expressed understanding of goal: yes  Action steps to achieve this goal:  1. I will complete form and return to clinic   2. I will update Care coordination team during next outreach                  Outreach Frequency: monthly    Plan:   Pt will meet with MCKENZIE CC to review options for [facility]  Pt will sign consent to communicate and return to care team

## 2022-04-01 NOTE — PROGRESS NOTES
Clinic Care Coordination Contact:    Coordinate care:   [facility] CHW in clinic today. Consent to communication form sent to patient today, 4- via mail per [facility] RN request.     Plan: Waiting to hear from patient. [employer] message sent to patient today. Care Coordinator will try to reach patient again in 1 month regarding to CHW outreach encounter dated 3-.

## 2022-04-13 ENCOUNTER — PATIENT OUTREACH (OUTPATIENT)
Dept: NURSING | Facility: CLINIC | Age: 27
End: 2022-04-13
Payer: COMMERCIAL

## 2022-04-13 NOTE — PROGRESS NOTES
Clinic Care Coordination Contact  Care Team Conversations    CCC MCKENZIE contacted Shekhar by phone. Shekhar had a poor experience with current medication provider and would like to switch. He also hasn't been able to connect with a therapist yet.     SW completed referral to [facility] and will check in with Shekhar next week to see if he heard from them.

## 2022-04-18 ENCOUNTER — PATIENT OUTREACH (OUTPATIENT)
Dept: NURSING | Facility: CLINIC | Age: 27
End: 2022-04-18
Payer: COMMERCIAL

## 2022-04-18 NOTE — PROGRESS NOTES
Clinic Care Coordination Contact  Community Health Worker Follow Up    Care Gaps:   Health Maintenance Due   Topic Date Due     ADVANCE CARE PLANNING  Never done     DEPRESSION ACTION PLAN  Never done     Deferred discussing care gaps with patient at next CHW outreach follow up due to patient time.    Goals:    Goals Addressed as of 4/18/2022 at 2:39 PM                    Today    4/1/22       Mental Health Management (pt-stated)   80%  10%    Added 12/20/21 by Aly Matos, [facility]      Goal Statement: I want to connect with a mental health therapist within 1-2 months.    Date Goal set: 12/20/21  Barriers:   Strengths:   Date to Achieve By: 2/29/2022  Patient expressed understanding of goal: Yes    Action steps to achieve this goal:  1. [facility] SW will send me resources to review.   2. I will attend the therapist appt schedule this Thursday, 4/21/2022. The mental health have connected with me about this. Completed an psychiatrist appt last week.  3. I will attend appt on 4- with [facility] SW and share more information.     4. I will review the resources and schedule or let [facility] team know if I need help scheduling.    (Updated: 4-)            Discussion:   [facility] CHW was able to connect with patient today. Patient shared info below. Updated above goal. Pt is reminded for tomorrow 4/19/2022 appt with [facility] SW via phone visit. Pt confirmed. CHW suggested patient to follow up with the psychiatrist and therapist team as recommendation and connect with CCC team for any additional resources need. Pt confirmed.      Pt shared:   -Had an psychiatrist appt last week. Have an appt with the therapist this Thursday (Fyi: which is 4/21/2022).   -Doing well. No other questions or concerns at this time.     CHW Next Follow-up: goals follow up     Outreach frequency: monthly     CHW Plan:   -Patient attend appt 4- at 3:00PM with [facility] SW via phone visit.  -CHW next outreach plan: 5-

## 2022-04-19 ENCOUNTER — PATIENT OUTREACH (OUTPATIENT)
Dept: NURSING | Facility: CLINIC | Age: 27
End: 2022-04-19
Payer: COMMERCIAL

## 2022-04-19 NOTE — PROGRESS NOTES
Clinic Care Coordination Contact    Follow Up Progress Note      Assessment: CCC SW checked in with Shekhar. He reported the medication appointment went well and has a new therapist starting this week.     Care Gaps:    Health Maintenance Due   Topic Date Due     ADVANCE CARE PLANNING  Never done     DEPRESSION ACTION PLAN  Never done       Deferred addressing care gaps due to SW follow up to discuss goal.      Goals addressed this encounter:    Goals Addressed                    This Visit's Progress       Mental Health Management (pt-stated)         Goal Statement: I want to connect with a mental health therapist within 1-2 months.    Date Goal set: 12/20/21  Barriers:   Strengths:   Date to Achieve By: 2/29/2022  Patient expressed understanding of goal: Yes    Action steps to achieve this goal:  1. [facility] SW will send me resources to review.   2. I will attend the therapist appt schedule this Thursday, 4/21/2022. The mental health have connected with me about this. Completed an psychiatrist appt last week.  3. I will update [facility] team              Intervention/Education provided during outreach: See above     Outreach Frequency: monthly        Plan:   Standard outreach

## 2022-04-27 ENCOUNTER — TELEPHONE (OUTPATIENT)
Dept: FAMILY MEDICINE | Facility: CLINIC | Age: 27
End: 2022-04-27
Payer: COMMERCIAL

## 2022-04-27 ENCOUNTER — MYC MEDICAL ADVICE (OUTPATIENT)
Dept: FAMILY MEDICINE | Facility: CLINIC | Age: 27
End: 2022-04-27
Payer: COMMERCIAL

## 2022-04-27 NOTE — TELEPHONE ENCOUNTER
RN received a call back from patient regarding the [facility]IS[facility] message:    I've been having pain similar to what I've had as an ovarian cyst and I'm wondering if I need to be checked out or if there's anything I can do about them.        Patient stated that the pain started this morning but has faded now. Patient had this kind of pain few years ago.   According to patient, the pain is located on lower abdominal pain on the left side with pain scale 8 out of 10. Denied any other symptoms at this time.      Patient declined visit at this time but will call back if she has more questions or concerns.       RN will route this message to Dr. Christensen as JESUS.      Desiree Burns RN  [facility]

## 2022-04-27 NOTE — TELEPHONE ENCOUNTER
Please see telephone encounter for more information.         Desiree Burns RN  [facility]

## 2022-04-27 NOTE — TELEPHONE ENCOUNTER
RN attempted to call patient  Regarding the mychart message.    Patient did not answer. Left non-detailed message to patient to call the clinic back.        Desiree Burns RN  [facility]

## 2022-05-03 ENCOUNTER — PATIENT OUTREACH (OUTPATIENT)
Dept: NURSING | Facility: CLINIC | Age: 27
End: 2022-05-03
Payer: COMMERCIAL

## 2022-05-03 ENCOUNTER — PATIENT OUTREACH (OUTPATIENT)
Dept: CARE COORDINATION | Facility: CLINIC | Age: 27
End: 2022-05-03
Payer: COMMERCIAL

## 2022-05-03 NOTE — PROGRESS NOTES
Clinic Care Coordination Contact    Follow Up Progress Note      Assessment:  RN CC outreach to parent as schedule  to support patient   No consent on file - child not available for verbal - no PHI given  Father reports pt has been in communication with therapist, unclear of any scheduled follow up     Father reports concern for pt undiagnosed mental health disability  - testing schedule for November and pt considering moving out of state.       RN CC schedule time to outreach later to get verbal consent or direct conversation      Goals addressed this encounter:    Goals Addressed                    This Visit's Progress       Patient Stated       Mental Health Management (pt-stated)   80%      Goal Statement: I want to connect with a mental health therapist within 1-2 months.    Date Goal set: 12/20/21  Barriers:   Strengths:   Date to Achieve By: 2/29/2022  Patient expressed understanding of goal: Yes    Action steps to achieve this goal:  1. [facility] SW will send me resources to review.   2. I will attend the therapist appt schedule this Thursday, 4/21/2022. The mental health have connected with me about this. Completed an psychiatrist appt last week.  3. I will update CCC team           Other (pt-stated)   10%      Goal Statement: I will complete a consent to communicate with father within 1-2 months  Date Goal set: 04/01/22  Barriers:   Strengths:   Date to Achieve By: April  Patient expressed understanding of goal: yes  Action steps to achieve this goal:  1. I will complete form and return to clinic   2. I will update Care coordination team during next outreach                    Plan:   RN CC will reach out in 1-3 days to follow up directly with patient

## 2022-05-03 NOTE — PROGRESS NOTES
"  Clinic Care Coordination Contact    Follow Up Progress Note      Assessment: Follow up from earlier call with parent   RN CC outreach to father cell phone and spoke with patient  Verbal consent given to speak with father and is willing to sign and mail in consent to communicate to have on file at clinic.     Patient did report that they are  working with therapist - weekly next visit not schedule yet.    Pt reports to have a visit with  psychiatrist   [facility] - Dr. Kianna Biggs on May 31st  Pt reports they are considering testing for \"neurodivergent\" ADHD,   Testing Neurpsy testing    Waiting with with [facility] recovery - possible October/ November but not scheduled yet.      Pt notes that they are working on becoming more independent from parents.  Have an upcoming vacation in [place] at end of month.  Pt works 5 hours/ week at [employer]  Pt reports struggling with \" state of world\" and often times it is hard to get out of bed - notes that likes to have virtual visit with [facility] so she can not have to leave home.     Pt verbal ok to speak with father, and ended call back with father to report a scheduled follow up call in two weeks to check on status of scheduling counseling visit and consent to communicate paperwork.    Father express gratitude for support.          Goals addressed this encounter:    Goals Addressed                    This Visit's Progress       Patient Stated       Medical (pt-stated)         Goal Statement: I will complete behavioral diagnostic testing within 6 months   Date Goal set: 05/04/22  Barriers: access  Strengths: family support  Date to Achieve By: November  Patient expressed understanding of goal: yes  Action steps to achieve this goal:  1. I will work with [facility] at [facility] to set up and complete testing   2. I will update Care coordination team during next outreach                COMPLETED: Mental Health Management (pt-stated)         Goal Statement: I want to " connect with a mental health therapist within 1-2 months.    Date Goal set: 12/20/21  Barriers:   Strengths:   Date to Achieve By: 2/29/2022  Patient expressed understanding of goal: Yes    Action steps to achieve this goal:  1. [facility] SW will send me resources to review.   2. I will attend the therapist appt schedule this Thursday, 4/21/2022. The mental health have connected with me about this. Completed an psychiatrist appt last week.  3. I will update CCC team           Other (pt-stated)   20%      Goal Statement: I will complete a consent to communicate with father within 1-2 months  Date Goal set: 04/01/22  Barriers:   Strengths:   Date to Achieve By: April  Patient expressed understanding of goal: yes  Action steps to achieve this goal:  1. I will complete form and return to clinic   2. I will update Care coordination team during next outreach                  Plan: Pt to schedule and follow up with counseling support and sign consent to communicate with family and return to clinic.    RN CC will reach out in two weeks to support engagement - available sooner if needed

## 2022-05-04 ENCOUNTER — PATIENT OUTREACH (OUTPATIENT)
Dept: CARE COORDINATION | Facility: CLINIC | Age: 27
End: 2022-05-04
Payer: COMMERCIAL

## 2022-05-04 NOTE — PROGRESS NOTES
Clinic Care Coordination Contact    Follow Up Progress Note      Assessment: RN CC received call from father noting pt mentioned  Hx about self harming behaviors at work, and then left for work.    Unable to assess for safety, but recommended father to follow up with patient therapist for direction and to outreach to patient to connect to support assessment of acute safety.     Father did not feel that patient was in emergent need of support, but was advised about emergency options including 911, and   emergency department, crisis connection.       Plan:    Family will reach out to acute care provider or emergency department for assessing patient safety and supports

## 2022-05-17 ENCOUNTER — PATIENT OUTREACH (OUTPATIENT)
Dept: CARE COORDINATION | Facility: CLINIC | Age: 27
End: 2022-05-17
Payer: COMMERCIAL

## 2022-05-17 NOTE — PROGRESS NOTES
Clinic Care Coordination Contact    Situation: Patient chart reviewed by care coordinator.    Background: CCC team role review, RN CC added as lead to support SW CC onbaording    Assessment: CHW engaged with patient and support progress to goal    Plan/Recommendations:  CC will review in 4-6 weeks, available sooner as needed.

## 2022-05-19 ENCOUNTER — PATIENT OUTREACH (OUTPATIENT)
Dept: NURSING | Facility: CLINIC | Age: 27
End: 2022-05-19
Payer: COMMERCIAL

## 2022-05-19 NOTE — PROGRESS NOTES
Clinic Care Coordination Contact  [facility]/Voicemail       Clinical Data: Care Coordinator Outreach  Outreach attempted x 1.  Left message on patient's voicemail with call back information and requested return call.  Plan: CCC to outreach per standard work and updated on goal progression

## 2022-07-14 ENCOUNTER — PATIENT OUTREACH (OUTPATIENT)
Dept: CARE COORDINATION | Facility: CLINIC | Age: 27
End: 2022-07-14

## 2022-07-14 NOTE — PROGRESS NOTES
Clinic Care Coordination Contact    Follow Up Progress Note      Assessment:    Father reports update on pt status  Pt has moved to [place] - possibly temporary.  Father states that the supports are in place to support independence. Getting a job,etc.  Father is hopeful that is a positive step for pt and is hopful for future progress in finding self    Father expressed appreciation for support from CC and care team.     RN CC graduated family, no further outreach at this time. Will be available in future if needed.         Goals addressed this encounter:    Goals Addressed                    This Visit's Progress       Patient Stated       COMPLETED: Other (pt-stated)         Goal Statement: I will complete a consent to communicate with father within 1-2 months  Date Goal set: 04/01/22  Barriers:   Strengths:   Date to Achieve By: April  Patient expressed understanding of goal: yes  Action steps to achieve this goal:  1. I will complete form and return to clinic   2. I will update Care coordination team during next outreach                         Plan: .Patient will follow up as needed with care team   CCC will be available in future if needed.

## 2022-09-18 ENCOUNTER — HEALTH MAINTENANCE LETTER (OUTPATIENT)
Age: 27
End: 2022-09-18

## 2023-05-07 ENCOUNTER — HEALTH MAINTENANCE LETTER (OUTPATIENT)
Age: 28
End: 2023-05-07

## 2024-07-14 ENCOUNTER — HEALTH MAINTENANCE LETTER (OUTPATIENT)
Age: 29
End: 2024-07-14